# Patient Record
Sex: FEMALE | Race: WHITE | Employment: FULL TIME | URBAN - METROPOLITAN AREA
[De-identification: names, ages, dates, MRNs, and addresses within clinical notes are randomized per-mention and may not be internally consistent; named-entity substitution may affect disease eponyms.]

---

## 2019-10-01 ENCOUNTER — THERAPY VISIT (OUTPATIENT)
Dept: PHYSICAL THERAPY | Facility: CLINIC | Age: 64
End: 2019-10-01
Payer: COMMERCIAL

## 2019-10-01 DIAGNOSIS — M19.172 POST-TRAUMATIC OSTEOARTHRITIS OF LEFT ANKLE: ICD-10-CM

## 2019-10-01 DIAGNOSIS — M25.572 PAIN IN JOINT INVOLVING ANKLE AND FOOT, LEFT: ICD-10-CM

## 2019-10-01 PROCEDURE — 97110 THERAPEUTIC EXERCISES: CPT | Mod: GP | Performed by: PHYSICAL THERAPIST

## 2019-10-01 PROCEDURE — 97161 PT EVAL LOW COMPLEX 20 MIN: CPT | Mod: GP | Performed by: PHYSICAL THERAPIST

## 2019-10-01 NOTE — LETTER
[facility] ATHLETIC [facility] PHYSICAL [facility]  [address] [address]  [phone]    2019    Re: Ilsa Marroquin   :   1955  MRN:  [id number]   REFERRING PHYSICIAN:   Abdirizak Quinn MD     [facility] ATHLETIC [facility]    Date of Initial Evaluation:  10/1/19  Visits:  Rxs Used: 1  Reason for Referral:     Pain in joint involving ankle and foot, left  Post-traumatic osteoarthritis of left ankle        [facility] Athletic [facility] Initial Evaluation    Subjective:  The history is provided by the patient.   Ilsa Marroquin being seen for L ankle pain.   Problem began 2019 (date of orders). Where condition occurred: other.Problem occurred: Initial injury ankle fx dislocation 30 years ago; tibtalar OA, hx of fibular avulsion fx, PTT dysfunction  and reported as 1/10 on pain scale. General health as reported by patient is good.          Primary job tasks include:  Lifting/carrying and prolonged standing.  Pain is described as aching and is intermittent. Pain is worse in the P.M. (end of day). Since onset symptoms are gradually improving. Special tests:  MRI.     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    Barriers include:  Stairs.    Type of problem:  Left ankle   Condition occurred with:  Degenerative joint disease. This is a chronic condition   Problem details: Date of orders 19; cortisone shot at that time. Injection was helpful..   Patient reports pain:  Anterior, medial calcaneal tuberosity and longitudinal arch. Radiates to:  Ankle and foot. Associated symptoms:  Loss of motion/stiffness, edema and buckling/giving out. Symptoms are exacerbated by bending/squatting, ascending stairs, descending stairs and standing and relieved by activity/movement and rest.     Pertinent medical history includes:  Asthma, depression, high blood pressure, numbness/tingling, overweight, rheumatoid  arthritis, sleep disorder/apnea and thyroid problems.    Surgeries include:  Other. Other surgery history details: benign cyst left breast .  Current medications:  Anti-depressants, high blood pressure medication, sleep medication and thyroid medication.              Patient is PCA.                Re: Ilsa Marroquin   :   1955               Objective:  Standing Alignment:    Ankle/Foot:  Pes planus R and pes planus L    Gait:    Weight Bearing Status:  WBAT     Deviations:  General Deviations:  Toe out L    Ankle/Foot Evaluation  ROM:  Arom ankle eval: comparable B.Prom wnl ankle: dec PF L ankle.    Strength wnl ankle: L ankle 3/5 post tib, peroneal, intrinsic. 5/5 R ankle gross planes of motion.    PALPATION:   Left ankle tenderness present at:  gastroc/soleus; posterior tibialis; anterior talofibular ligament and anterior tibiofibular ligament  Right ankle tenderness present at:   posterior tibialis    EDEMA: Edema ankle: TCJ edema noted L ankle.    Assessment/Plan:    Patient is a 63 year old female with left side ankle complaints.    Patient has the following significant findings with corresponding treatment plan.                Diagnosis 1:  L ankle OA, L ankle pain  Pain -  hot/cold therapy, manual therapy, splint/taping/bracing/orthotics, self management, education and home program  Decreased ROM/flexibility - manual therapy, therapeutic exercise and home program  Decreased joint mobility - manual therapy, therapeutic exercise and home program  Decreased strength - therapeutic exercise, therapeutic activities and home program  Decreased proprioception - neuro re-education, gait training, therapeutic activities and home program  Impaired gait - gait training and home program  Impaired muscle performance - neuro re-education and home program                                    Re: Ilsa Mraroquin   :   1955                Therapy Evaluation Codes:   Cumulative Therapy Evaluation is: Low  complexity.    Previous and current functional limitations:  (See Goal Flow Sheet for this information)    Short term and Long term goals: (See Goal Flow Sheet for this information)     Communication ability:  Patient appears to be able to clearly communicate and understand verbal and written communication and follow directions correctly.  Treatment Explanation - The following has been discussed with the patient:   RX ordered/plan of care  Anticipated outcomes  Possible risks and side effects  This patient would benefit from PT intervention to resume normal activities.   Rehab potential is good.    Frequency:  1 X week, once daily  Duration:  for 4 weeks tapering to 2 X a month over 8 weeks  Discharge Plan:  Achieve all LTG.  Independent in home treatment program.  Reach maximal therapeutic benefit.    Please refer to the daily flowsheet for treatment today, total treatment time and time spent performing 1:1 timed codes.       Thank you for your referral.    INQUIRIES  Therapist: Eleonora Trent DPT   [facility] PHYSICAL THERAPY  [address] [place] [id number]  Phone: [phone]  Fax: [phone]

## 2019-10-01 NOTE — PROGRESS NOTES
[place] [facility] Initial Evaluation  Subjective:  The history is provided by the patient.   Ilsa Marroquin being seen for L ankle pain.   Problem began 9/16/2019 (date of orders). Where condition occurred: other.Problem occurred: Initial injury ankle fx dislocation 30 years ago; tibtalar OA, hx of fibular avulsion fx, PTT dysfunction  and reported as 1/10 on pain scale. General health as reported by patient is good.          Primary job tasks include:  Lifting/carrying and prolonged standing.  Pain is described as aching and is intermittent. Pain is worse in the P.M. (end of day). Since onset symptoms are gradually improving. Special tests:  MRI.     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    Barriers include:  Stairs.    Type of problem:  Left ankle   Condition occurred with:  Degenerative joint disease. This is a chronic condition   Problem details: Date of orders 9/16/19; cortisone shot at that time. Injection was helpful..   Patient reports pain:  Anterior, medial calcaneal tuberosity and longitudinal arch. Radiates to:  Ankle and foot. Associated symptoms:  Loss of motion/stiffness, edema and buckling/giving out. Symptoms are exacerbated by bending/squatting, ascending stairs, descending stairs and standing and relieved by activity/movement and rest.                      Objective:  Standing Alignment:                Ankle/Foot:  Pes planus R and pes planus L    Gait:    Weight Bearing Status:  WBAT     Deviations:  General Deviations:  Toe out L          Ankle/Foot Evaluation  ROM:  Arom ankle eval: comparable B.Prom wnl ankle: dec PF L ankle.      Strength wnl ankle: L ankle 3/5 post tib, peroneal, intrinsic. 5/5 R ankle gross planes of motion.      PALPATION:   Left ankle tenderness present at:  gastroc/soleus; posterior tibialis; anterior talofibular ligament and anterior tibiofibular ligament  Right ankle tenderness present at:   posterior  tibialis  EDEMA: Edema ankle: TCJ edema noted L ankle.                                                              General     ROS    Assessment/Plan:    Patient is a 63 year old female with left side ankle complaints.    Patient has the following significant findings with corresponding treatment plan.                Diagnosis 1:  L ankle OA, L ankle pain  Pain -  hot/cold therapy, manual therapy, splint/taping/bracing/orthotics, self management, education and home program  Decreased ROM/flexibility - manual therapy, therapeutic exercise and home program  Decreased joint mobility - manual therapy, therapeutic exercise and home program  Decreased strength - therapeutic exercise, therapeutic activities and home program  Decreased proprioception - neuro re-education, gait training, therapeutic activities and home program  Impaired gait - gait training and home program  Impaired muscle performance - neuro re-education and home program    Therapy Evaluation Codes:   Cumulative Therapy Evaluation is: Low complexity.    Previous and current functional limitations:  (See Goal Flow Sheet for this information)    Short term and Long term goals: (See Goal Flow Sheet for this information)     Communication ability:  Patient appears to be able to clearly communicate and understand verbal and written communication and follow directions correctly.  Treatment Explanation - The following has been discussed with the patient:   RX ordered/plan of care  Anticipated outcomes  Possible risks and side effects  This patient would benefit from PT intervention to resume normal activities.   Rehab potential is good.    Frequency:  1 X week, once daily  Duration:  for 4 weeks tapering to 2 X a month over 8 weeks  Discharge Plan:  Achieve all LTG.  Independent in home treatment program.  Reach maximal therapeutic benefit.    Please refer to the daily flowsheet for treatment today, total treatment time and time spent performing 1:1 timed codes.

## 2019-10-03 NOTE — PROGRESS NOTES
[place] [facility] Initial Evaluation  Subjective:       Pertinent medical history includes:  Asthma, depression, high blood pressure, numbness/tingling, overweight, rheumatoid arthritis, sleep disorder/apnea and thyroid problems.    Surgeries include:  Other. Other surgery history details: benign cyst left breast .  Current medications:  Anti-depressants, high blood pressure medication, sleep medication and thyroid medication.              Patient is PCA.                           Objective:  System    Physical Exam    General     ROS    Assessment/Plan:

## 2019-10-16 ENCOUNTER — THERAPY VISIT (OUTPATIENT)
Dept: PHYSICAL THERAPY | Facility: CLINIC | Age: 64
End: 2019-10-16
Payer: COMMERCIAL

## 2019-10-16 DIAGNOSIS — M19.172 POST-TRAUMATIC OSTEOARTHRITIS OF LEFT ANKLE: ICD-10-CM

## 2019-10-16 DIAGNOSIS — M25.572 PAIN IN JOINT INVOLVING ANKLE AND FOOT, LEFT: ICD-10-CM

## 2019-10-16 PROCEDURE — 97110 THERAPEUTIC EXERCISES: CPT | Mod: GP | Performed by: PHYSICAL THERAPIST

## 2019-10-23 ENCOUNTER — THERAPY VISIT (OUTPATIENT)
Dept: PHYSICAL THERAPY | Facility: CLINIC | Age: 64
End: 2019-10-23
Payer: COMMERCIAL

## 2019-10-23 DIAGNOSIS — M25.572 PAIN IN JOINT INVOLVING ANKLE AND FOOT, LEFT: ICD-10-CM

## 2019-10-23 DIAGNOSIS — M19.172 POST-TRAUMATIC OSTEOARTHRITIS OF LEFT ANKLE: ICD-10-CM

## 2019-10-23 PROCEDURE — 97110 THERAPEUTIC EXERCISES: CPT | Mod: GP | Performed by: PHYSICAL THERAPIST

## 2019-10-30 ENCOUNTER — THERAPY VISIT (OUTPATIENT)
Dept: PHYSICAL THERAPY | Facility: CLINIC | Age: 64
End: 2019-10-30
Payer: COMMERCIAL

## 2019-10-30 DIAGNOSIS — M25.572 PAIN IN JOINT INVOLVING ANKLE AND FOOT, LEFT: ICD-10-CM

## 2019-10-30 DIAGNOSIS — M19.172 POST-TRAUMATIC OSTEOARTHRITIS OF LEFT ANKLE: ICD-10-CM

## 2019-10-30 PROCEDURE — 97110 THERAPEUTIC EXERCISES: CPT | Mod: GP | Performed by: PHYSICAL THERAPIST

## 2019-10-30 PROCEDURE — 97112 NEUROMUSCULAR REEDUCATION: CPT | Mod: GP | Performed by: PHYSICAL THERAPIST

## 2019-11-20 ENCOUNTER — THERAPY VISIT (OUTPATIENT)
Dept: PHYSICAL THERAPY | Facility: CLINIC | Age: 64
End: 2019-11-20
Payer: COMMERCIAL

## 2019-11-20 DIAGNOSIS — M25.572 PAIN IN JOINT INVOLVING ANKLE AND FOOT, LEFT: ICD-10-CM

## 2019-11-20 DIAGNOSIS — M19.172 POST-TRAUMATIC OSTEOARTHRITIS OF LEFT ANKLE: ICD-10-CM

## 2019-11-20 PROCEDURE — 97110 THERAPEUTIC EXERCISES: CPT | Mod: GP | Performed by: PHYSICAL THERAPIST

## 2019-11-20 PROCEDURE — 97112 NEUROMUSCULAR REEDUCATION: CPT | Mod: GP | Performed by: PHYSICAL THERAPIST

## 2019-12-10 ENCOUNTER — THERAPY VISIT (OUTPATIENT)
Dept: PHYSICAL THERAPY | Facility: CLINIC | Age: 64
End: 2019-12-10
Payer: COMMERCIAL

## 2019-12-10 DIAGNOSIS — M25.572 PAIN IN JOINT INVOLVING ANKLE AND FOOT, LEFT: ICD-10-CM

## 2019-12-10 DIAGNOSIS — M19.172 POST-TRAUMATIC OSTEOARTHRITIS OF LEFT ANKLE: ICD-10-CM

## 2019-12-10 PROCEDURE — 97530 THERAPEUTIC ACTIVITIES: CPT | Mod: GP | Performed by: PHYSICAL THERAPIST

## 2019-12-10 PROCEDURE — 97110 THERAPEUTIC EXERCISES: CPT | Mod: GP | Performed by: PHYSICAL THERAPIST

## 2019-12-10 NOTE — PROGRESS NOTES
Subjective:  HPI                    Objective:  System    Physical Exam    General     ROS    Assessment/Plan:    DISCHARGE REPORT    Progress reporting period is from 10/1/19 to 12/10/19.     SUBJECTIVE  Subjective: Hospitalized for bronchitis, recovered and feeling better. Taking steps reciprocally up and down stairs. Reports improved balance and decreased episodes of instability   Current Pain level: 0/10         The subjective and objective information are from the last SOAP note on this patient.    OBJECTIVE  Objective: gait w/o dev, improved endurance. Focused on proximal strength and establishing home program for weight management and strength      ASSESSMENT/PLAN  Updated problem list and treatment plan: Diagnosis 1:  L ankle pain, tibiotalar OA and PTTD  Decreased strength - home program  Decreased function - home program  STG/LTGs have been met or progress has been made towards goals:  Yes (See Goal flow sheet completed today.)  Assessment of Progress: The patient's condition is improving.  The patient's condition has potential to improve.  The patient has had set backs in their progress.  Self Management Plans:  Patient has been instructed in a home treatment program.  Patient is independent in a home treatment program.  Patient  has been instructed in self management of symptoms.  Patient is independent in self management of symptoms.  I have re-evaluated this patient and find that the nature, scope, duration and intensity of the therapy is appropriate for the medical condition of the patient.  Ilsa continues to require the following intervention to meet STG and LTG's: PT intervention is no longer required to meet STG/LTG.  We will discharge this patient from PT.    Recommendations:  This patient is ready to be discharged from therapy and continue their home treatment program.    Please refer to the daily flowsheet for treatment today, total treatment time and time spent performing 1:1 timed codes.

## 2020-03-11 ENCOUNTER — HEALTH MAINTENANCE LETTER (OUTPATIENT)
Age: 65
End: 2020-03-11

## 2021-01-03 ENCOUNTER — HEALTH MAINTENANCE LETTER (OUTPATIENT)
Age: 66
End: 2021-01-03

## 2021-04-25 ENCOUNTER — HEALTH MAINTENANCE LETTER (OUTPATIENT)
Age: 66
End: 2021-04-25

## 2021-05-25 ENCOUNTER — RECORDS - HEALTHEAST (OUTPATIENT)
Dept: ADMINISTRATIVE | Facility: CLINIC | Age: 66
End: 2021-05-25

## 2021-05-26 ENCOUNTER — RECORDS - HEALTHEAST (OUTPATIENT)
Dept: ADMINISTRATIVE | Facility: CLINIC | Age: 66
End: 2021-05-26

## 2021-10-10 ENCOUNTER — HEALTH MAINTENANCE LETTER (OUTPATIENT)
Age: 66
End: 2021-10-10

## 2022-03-26 ENCOUNTER — HEALTH MAINTENANCE LETTER (OUTPATIENT)
Age: 67
End: 2022-03-26

## 2022-05-22 ENCOUNTER — HEALTH MAINTENANCE LETTER (OUTPATIENT)
Age: 67
End: 2022-05-22

## 2022-09-18 ENCOUNTER — HEALTH MAINTENANCE LETTER (OUTPATIENT)
Age: 67
End: 2022-09-18

## 2023-06-04 ENCOUNTER — HEALTH MAINTENANCE LETTER (OUTPATIENT)
Age: 68
End: 2023-06-04